# Patient Record
Sex: FEMALE | Race: WHITE | Employment: OTHER | URBAN - NONMETROPOLITAN AREA
[De-identification: names, ages, dates, MRNs, and addresses within clinical notes are randomized per-mention and may not be internally consistent; named-entity substitution may affect disease eponyms.]

---

## 2019-09-21 ENCOUNTER — OFFICE VISIT (OUTPATIENT)
Dept: PRIMARY CARE CLINIC | Age: 64
End: 2019-09-21
Payer: COMMERCIAL

## 2019-09-21 VITALS
SYSTOLIC BLOOD PRESSURE: 136 MMHG | BODY MASS INDEX: 29.87 KG/M2 | OXYGEN SATURATION: 98 % | WEIGHT: 158.2 LBS | RESPIRATION RATE: 18 BRPM | TEMPERATURE: 98.1 F | DIASTOLIC BLOOD PRESSURE: 78 MMHG | HEART RATE: 77 BPM | HEIGHT: 61 IN

## 2019-09-21 DIAGNOSIS — T50.905A ADVERSE EFFECT OF DRUG, INITIAL ENCOUNTER: Primary | ICD-10-CM

## 2019-09-21 PROCEDURE — G8427 DOCREV CUR MEDS BY ELIG CLIN: HCPCS | Performed by: FAMILY MEDICINE

## 2019-09-21 PROCEDURE — 1036F TOBACCO NON-USER: CPT | Performed by: FAMILY MEDICINE

## 2019-09-21 PROCEDURE — G8419 CALC BMI OUT NRM PARAM NOF/U: HCPCS | Performed by: FAMILY MEDICINE

## 2019-09-21 PROCEDURE — 99202 OFFICE O/P NEW SF 15 MIN: CPT | Performed by: FAMILY MEDICINE

## 2019-09-21 PROCEDURE — 3017F COLORECTAL CA SCREEN DOC REV: CPT | Performed by: FAMILY MEDICINE

## 2019-09-21 RX ORDER — ACETAMINOPHEN, ASPIRIN AND CAFFEINE 250; 250; 65 MG/1; MG/1; MG/1
1 TABLET, FILM COATED ORAL
COMMUNITY

## 2019-09-21 RX ORDER — VENLAFAXINE HYDROCHLORIDE 150 MG/1
CAPSULE, EXTENDED RELEASE ORAL
COMMUNITY
Start: 2019-07-22

## 2019-09-21 RX ORDER — ACETAMINOPHEN 500 MG
500 TABLET ORAL
COMMUNITY

## 2019-09-21 RX ORDER — PREDNISONE 20 MG/1
TABLET ORAL
Qty: 10 TABLET | Refills: 0 | Status: SHIPPED | OUTPATIENT
Start: 2019-09-21 | End: 2022-07-28

## 2019-09-21 RX ORDER — CHLORAL HYDRATE 500 MG
2 CAPSULE ORAL
COMMUNITY

## 2019-09-21 RX ORDER — LEVOTHYROXINE SODIUM 88 UG/1
TABLET ORAL
COMMUNITY
Start: 2018-10-25

## 2019-09-21 RX ORDER — LISINOPRIL 40 MG/1
TABLET ORAL
COMMUNITY
Start: 2019-07-30

## 2019-09-21 ASSESSMENT — ENCOUNTER SYMPTOMS
COLOR CHANGE: 1
EYES NEGATIVE: 1
CHEST TIGHTNESS: 0
SHORTNESS OF BREATH: 0
GASTROINTESTINAL NEGATIVE: 1
SORE THROAT: 0
TROUBLE SWALLOWING: 0
FACIAL SWELLING: 1
WHEEZING: 0

## 2019-09-21 NOTE — PROGRESS NOTES
Think that [facility] would be an appropriate next choice. Orders Placed This Encounter   Medications    predniSONE (DELTASONE) 20 MG tablet     Si bid for 3 days, 1 qd for 4 days     Dispense:  10 tablet     Refill:  0     Will give prednisone to help with acute reaction. Can use benadryl or claritin or zyrtec. Return  if no improvement in symptoms or if any further symptoms arise. \    No follow-ups on file. An electronic signature was used to authenticate this note.     --Julio César Cuba DO on 2019 at 3:24 PM

## 2022-07-28 ENCOUNTER — OFFICE VISIT (OUTPATIENT)
Dept: PRIMARY CARE CLINIC | Age: 67
End: 2022-07-28
Payer: MEDICARE

## 2022-07-28 VITALS
WEIGHT: 156 LBS | DIASTOLIC BLOOD PRESSURE: 88 MMHG | BODY MASS INDEX: 29.48 KG/M2 | HEART RATE: 74 BPM | SYSTOLIC BLOOD PRESSURE: 120 MMHG | TEMPERATURE: 98.2 F

## 2022-07-28 DIAGNOSIS — L23.7 POISON IVY DERMATITIS: Primary | ICD-10-CM

## 2022-07-28 PROCEDURE — 1090F PRES/ABSN URINE INCON ASSESS: CPT | Performed by: FAMILY MEDICINE

## 2022-07-28 PROCEDURE — 3017F COLORECTAL CA SCREEN DOC REV: CPT | Performed by: FAMILY MEDICINE

## 2022-07-28 PROCEDURE — 99212 OFFICE O/P EST SF 10 MIN: CPT | Performed by: FAMILY MEDICINE

## 2022-07-28 PROCEDURE — G8400 PT W/DXA NO RESULTS DOC: HCPCS | Performed by: FAMILY MEDICINE

## 2022-07-28 PROCEDURE — G8419 CALC BMI OUT NRM PARAM NOF/U: HCPCS | Performed by: FAMILY MEDICINE

## 2022-07-28 PROCEDURE — 1036F TOBACCO NON-USER: CPT | Performed by: FAMILY MEDICINE

## 2022-07-28 PROCEDURE — 1123F ACP DISCUSS/DSCN MKR DOCD: CPT | Performed by: FAMILY MEDICINE

## 2022-07-28 PROCEDURE — 99213 OFFICE O/P EST LOW 20 MIN: CPT | Performed by: FAMILY MEDICINE

## 2022-07-28 PROCEDURE — G8427 DOCREV CUR MEDS BY ELIG CLIN: HCPCS | Performed by: FAMILY MEDICINE

## 2022-07-28 RX ORDER — PREDNISONE 20 MG/1
TABLET ORAL
Qty: 15 TABLET | Refills: 0 | Status: SHIPPED | OUTPATIENT
Start: 2022-07-28 | End: 2022-07-28 | Stop reason: SDUPTHER

## 2022-07-28 RX ORDER — PREDNISONE 20 MG/1
TABLET ORAL
Qty: 15 TABLET | Refills: 0 | Status: SHIPPED | OUTPATIENT
Start: 2022-07-28

## 2022-07-28 ASSESSMENT — PATIENT HEALTH QUESTIONNAIRE - PHQ9
SUM OF ALL RESPONSES TO PHQ QUESTIONS 1-9: 0
SUM OF ALL RESPONSES TO PHQ QUESTIONS 1-9: 0
2. FEELING DOWN, DEPRESSED OR HOPELESS: 0
SUM OF ALL RESPONSES TO PHQ QUESTIONS 1-9: 0
SUM OF ALL RESPONSES TO PHQ9 QUESTIONS 1 & 2: 0
1. LITTLE INTEREST OR PLEASURE IN DOING THINGS: 0
SUM OF ALL RESPONSES TO PHQ QUESTIONS 1-9: 0

## 2022-07-28 ASSESSMENT — ENCOUNTER SYMPTOMS
WHEEZING: 0
SORE THROAT: 0
ABDOMINAL PAIN: 0
COUGH: 0
SHORTNESS OF BREATH: 0
COLOR CHANGE: 1
DIARRHEA: 0
NAUSEA: 0
RHINORRHEA: 0

## 2022-07-28 NOTE — PROGRESS NOTES
[facility] URGENT CARE A DEPARTMENT OF [place] [address]  [address] [place] [address]  Dept: [phone]  Dept Fax: [phone]    Davian Lopez is a 74 y.o. female who presents today for her medical conditions/complaints as noted below. Davian Lopez is c/o of   Chief Complaint   Patient presents with    Rash       HPI:     Here today for a rash. Rash  This is a new problem. The current episode started in the past 7 days (4 days). The affected locations include the face and neck. The rash is characterized by itchiness, draining and redness. She was exposed to plant contact. Pertinent negatives include no congestion, cough, diarrhea, fatigue, fever, rhinorrhea, shortness of breath or sore throat. Past treatments include nothing. The treatment provided no relief. History reviewed. No pertinent past medical history. Social History     Tobacco Use    Smoking status: Never    Smokeless tobacco: Never   Substance Use Topics    Alcohol use: Not on file     Current Outpatient Medications   Medication Sig Dispense Refill    venlafaxine (EFFEXOR XR) 150 MG extended release capsule       lisinopril (PRINIVIL;ZESTRIL) 40 MG tablet       levothyroxine (SYNTHROID) 88 MCG tablet TAKE 1 TABLET DAILY      aspirin-acetaminophen-caffeine (EXCEDRIN MIGRAINE) 250-250-65 MG per tablet Take 1 tablet by mouth      Multiple Vitamins-Minerals (MULTIVITAMIN ADULT EXTRA C PO) Take 1 tablet by mouth      Omega-3 Fatty Acids (FISH OIL) 1000 MG CAPS Take 2 g by mouth      acetaminophen (TYLENOL) 500 MG tablet Take 500 mg by mouth      predniSONE (DELTASONE) 20 MG tablet 1 bid for 5 days, 1 qd for 5 days 15 tablet 0     No current facility-administered medications for this visit.           Allergies   Allergen Reactions    Statins Rash    Lovastatin      MYALGIAS    Fenofibrate Rash     Generalized erythema and skin peeling, swelling of lips. Subjective:     Review of Systems   Constitutional:  Negative for activity change, appetite change, fatigue and fever. HENT:  Negative for congestion, rhinorrhea and sore throat. Respiratory:  Negative for cough, shortness of breath and wheezing. Gastrointestinal:  Negative for abdominal pain, diarrhea and nausea. Skin:  Positive for color change and rash. Objective:      Physical Exam  Vitals and nursing note reviewed. Constitutional:       General: She is not in acute distress. Appearance: She is well-developed. Eyes:      Conjunctiva/sclera: Conjunctivae normal.   Neck:      Thyroid: No thyromegaly. Cardiovascular:      Rate and Rhythm: Normal rate and regular rhythm. Heart sounds: Normal heart sounds. No murmur heard. Pulmonary:      Effort: Pulmonary effort is normal. No respiratory distress. Breath sounds: Normal breath sounds. No wheezing. Musculoskeletal:      Cervical back: Normal range of motion and neck supple. Lymphadenopathy:      Cervical: No cervical adenopathy. Skin:     General: Skin is warm and dry. Findings: Rash present. No erythema. Rash is vesicular. Neurological:      Mental Status: She is alert and oriented to person, place, and time. /88 (Site: Left Upper Arm, Position: Sitting, Cuff Size: Large Adult)   Pulse 74   Temp 98.2 °F (36.8 °C) (Tympanic)   Wt 156 lb (70.8 kg)   BMI 29.48 kg/m²     Assessment:       Diagnosis Orders   1. Poison ivy dermatitis                  Plan:       Poison ivy: new; she was given a steroid taper. she was advised to wash her hands and clothes if she comes into contact with poison ivy in the future. Return if symptoms worsen or fail to improve.       Orders Placed This Encounter   Medications    DISCONTD: predniSONE (DELTASONE) 20 MG tablet     Si bid for 5 days, 1 qd for 5 days     Dispense:  15 tablet     Refill:  0    predniSONE (DELTASONE) 20 MG tablet Si bid for 5 days, 1 qd for 5 days     Dispense:  15 tablet     Refill:  0         Patientgiven educational materials - see patient instructions. Discussed use, benefit,and side effects of prescribed medications. All patient questions answered. Ptvoiced understanding. Reviewed health maintenance. Instructed to continue currentmedications, diet and exercise. Patient agreed with treatment plan. Follow up asdirected.      Electronically signed by Barbara Groves MD on 2022 at 12:51 PM

## 2022-12-09 ENCOUNTER — OFFICE VISIT (OUTPATIENT)
Dept: PRIMARY CARE CLINIC | Age: 67
End: 2022-12-09
Payer: MEDICARE

## 2022-12-09 ENCOUNTER — HOSPITAL ENCOUNTER (OUTPATIENT)
Age: 67
Setting detail: SPECIMEN
Discharge: HOME | End: 2022-12-09
Payer: MEDICARE

## 2022-12-09 VITALS
TEMPERATURE: 96.8 F | DIASTOLIC BLOOD PRESSURE: 80 MMHG | HEIGHT: 61 IN | HEART RATE: 97 BPM | WEIGHT: 157 LBS | SYSTOLIC BLOOD PRESSURE: 108 MMHG | BODY MASS INDEX: 29.64 KG/M2 | OXYGEN SATURATION: 96 %

## 2022-12-09 DIAGNOSIS — R30.0 DYSURIA: ICD-10-CM

## 2022-12-09 DIAGNOSIS — N30.01 ACUTE CYSTITIS WITH HEMATURIA: Primary | ICD-10-CM

## 2022-12-09 LAB
BACTERIA: ABNORMAL
BILIRUBIN URINE: NEGATIVE
COMMENT UA: ABNORMAL
EPITHELIAL CELLS UA: ABNORMAL /HPF (ref 0–5)
GLUCOSE URINE: ABNORMAL
KETONES, URINE: ABNORMAL
LEUKOCYTE ESTERASE, URINE: ABNORMAL
NITRITE, URINE: POSITIVE
OTHER OBSERVATIONS UA: ABNORMAL
PH UA: 7 (ref 5–6)
PROTEIN UA: ABNORMAL
RBC UA: ABNORMAL /HPF (ref 0–4)
SPECIFIC GRAVITY UA: 1.01 (ref 1.01–1.02)
URINE HGB: ABNORMAL
UROBILINOGEN, URINE: ABNORMAL
WBC UA: >50 /HPF (ref 0–4)

## 2022-12-09 PROCEDURE — 87186 SC STD MICRODIL/AGAR DIL: CPT

## 2022-12-09 PROCEDURE — 87086 URINE CULTURE/COLONY COUNT: CPT

## 2022-12-09 PROCEDURE — G8427 DOCREV CUR MEDS BY ELIG CLIN: HCPCS | Performed by: STUDENT IN AN ORGANIZED HEALTH CARE EDUCATION/TRAINING PROGRAM

## 2022-12-09 PROCEDURE — G8417 CALC BMI ABV UP PARAM F/U: HCPCS | Performed by: STUDENT IN AN ORGANIZED HEALTH CARE EDUCATION/TRAINING PROGRAM

## 2022-12-09 PROCEDURE — 1036F TOBACCO NON-USER: CPT | Performed by: STUDENT IN AN ORGANIZED HEALTH CARE EDUCATION/TRAINING PROGRAM

## 2022-12-09 PROCEDURE — 1090F PRES/ABSN URINE INCON ASSESS: CPT | Performed by: STUDENT IN AN ORGANIZED HEALTH CARE EDUCATION/TRAINING PROGRAM

## 2022-12-09 PROCEDURE — 81001 URINALYSIS AUTO W/SCOPE: CPT

## 2022-12-09 PROCEDURE — 99213 OFFICE O/P EST LOW 20 MIN: CPT | Performed by: STUDENT IN AN ORGANIZED HEALTH CARE EDUCATION/TRAINING PROGRAM

## 2022-12-09 PROCEDURE — G8484 FLU IMMUNIZE NO ADMIN: HCPCS | Performed by: STUDENT IN AN ORGANIZED HEALTH CARE EDUCATION/TRAINING PROGRAM

## 2022-12-09 PROCEDURE — G8400 PT W/DXA NO RESULTS DOC: HCPCS | Performed by: STUDENT IN AN ORGANIZED HEALTH CARE EDUCATION/TRAINING PROGRAM

## 2022-12-09 PROCEDURE — 87088 URINE BACTERIA CULTURE: CPT

## 2022-12-09 PROCEDURE — 3017F COLORECTAL CA SCREEN DOC REV: CPT | Performed by: STUDENT IN AN ORGANIZED HEALTH CARE EDUCATION/TRAINING PROGRAM

## 2022-12-09 PROCEDURE — 1123F ACP DISCUSS/DSCN MKR DOCD: CPT | Performed by: STUDENT IN AN ORGANIZED HEALTH CARE EDUCATION/TRAINING PROGRAM

## 2022-12-09 RX ORDER — SULFAMETHOXAZOLE AND TRIMETHOPRIM 800; 160 MG/1; MG/1
1 TABLET ORAL 2 TIMES DAILY
Qty: 10 TABLET | Refills: 0 | Status: SHIPPED | OUTPATIENT
Start: 2022-12-09 | End: 2022-12-14

## 2022-12-09 ASSESSMENT — ENCOUNTER SYMPTOMS
COUGH: 0
TROUBLE SWALLOWING: 0
SHORTNESS OF BREATH: 0
DIARRHEA: 0
ABDOMINAL PAIN: 0
BLOOD IN STOOL: 0
NAUSEA: 0
EYE PAIN: 0
VOMITING: 0
CONSTIPATION: 0

## 2022-12-09 ASSESSMENT — PATIENT HEALTH QUESTIONNAIRE - PHQ9
SUM OF ALL RESPONSES TO PHQ QUESTIONS 1-9: 0
SUM OF ALL RESPONSES TO PHQ QUESTIONS 1-9: 0
SUM OF ALL RESPONSES TO PHQ9 QUESTIONS 1 & 2: 0
1. LITTLE INTEREST OR PLEASURE IN DOING THINGS: 0
SUM OF ALL RESPONSES TO PHQ QUESTIONS 1-9: 0
SUM OF ALL RESPONSES TO PHQ QUESTIONS 1-9: 0
2. FEELING DOWN, DEPRESSED OR HOPELESS: 0

## 2022-12-09 NOTE — PROGRESS NOTES
[facility] ([place], [place]) Urgent Care             [address] [facility], [place], [address]                        Telephone [phone]             Fax [phone]       Bertha Dumont  :  1955  Age:  74 y.o. MRN:  [id number]  Date of visit:  2022     Assessment and Plan:    1. Acute cystitis with hematuria  UA positive for acute cystitis with hematuria we will treat with Bactrim double strength twice daily for 5 days. Recommend returning to the urgent care for any new or worsening symptoms. Follow-up as needed for this issue. Culture taken today will follow up on the culture results when they are available and 2 to 3 days. - sulfamethoxazole-trimethoprim (BACTRIM DS) 800-160 MG per tablet; Take 1 tablet by mouth 2 times daily for 5 days  Dispense: 10 tablet; Refill: 0    2. Dysuria  - Urinalysis with Reflex to Culture; Future      Subjective:    Bertha Dumont is a 74 y.o. female who presents to [facility] ([place], [place]) Urgent Care today (2022) for evaluation of:  Urinary Frequency (Urgency,painful and burning. Sx began Tuesday.  )    Patient is a 82-year-old female who presents to the urgent care for evaluation of urgency, painful urination for the last 3 to 4 days. States that she is also been having some mild pelvic tenderness and low back pain over the same time. She states that she has not had any fevers, chills, night sweats. Denies any other systemic symptoms at this time. Chief Complaint   Patient presents with    Urinary Frequency     Urgency,painful and burning. Sx began Tuesday. She has the following problem list:  There is no problem list on file for this patient. Review of Systems   Constitutional:  Negative for chills, fatigue and fever. HENT:  Negative for ear pain, postnasal drip and trouble swallowing. Eyes:  Negative for pain and visual disturbance.    Respiratory:  Negative for cough and shortness of breath. Cardiovascular:  Negative for chest pain and palpitations. Gastrointestinal:  Negative for abdominal pain, blood in stool, constipation, diarrhea, nausea and vomiting. Genitourinary:  Positive for dysuria, frequency and urgency. Skin:  Negative for rash and wound. Neurological:  Negative for dizziness and headaches. Psychiatric/Behavioral:  Negative for dysphoric mood. The patient is not nervous/anxious. Current medications are:  Current Outpatient Medications   Medication Sig Dispense Refill    ELDERBERRY PO Take by mouth      Zinc Acetate, Oral, (ZINC ACETATE PO) Take by mouth      sulfamethoxazole-trimethoprim (BACTRIM DS) 800-160 MG per tablet Take 1 tablet by mouth 2 times daily for 5 days 10 tablet 0    venlafaxine (EFFEXOR XR) 150 MG extended release capsule       lisinopril (PRINIVIL;ZESTRIL) 40 MG tablet       levothyroxine (SYNTHROID) 88 MCG tablet TAKE 1 TABLET DAILY      aspirin-acetaminophen-caffeine (EXCEDRIN MIGRAINE) 250-250-65 MG per tablet Take 1 tablet by mouth      Multiple Vitamins-Minerals (MULTIVITAMIN ADULT EXTRA C PO) Take 1 tablet by mouth      Omega-3 Fatty Acids (FISH OIL) 1000 MG CAPS Take 2 g by mouth      acetaminophen (TYLENOL) 500 MG tablet Take 500 mg by mouth      predniSONE (DELTASONE) 20 MG tablet 1 bid for 5 days, 1 qd for 5 days (Patient not taking: Reported on 12/9/2022) 15 tablet 0     No current facility-administered medications for this visit. She is allergic to statins, lovastatin, and fenofibrate. She  reports that she has never smoked. She has never used smokeless tobacco.      Objective:    Vitals:    12/09/22 1224   BP: 108/80   Pulse: 97   Temp: 96.8 °F (36 °C)   TempSrc: Tympanic   SpO2: 96%   Weight: 157 lb (71.2 kg)   Height: 5' 1\" (1.549 m)     Body mass index is 29.66 kg/m². Physical Exam  Vitals and nursing note reviewed. Constitutional:       General: She is not in acute distress. Appearance: She is well-developed. She is not diaphoretic. HENT:      Head: Normocephalic and atraumatic. Right Ear: External ear normal.      Left Ear: External ear normal.      Nose: Nose normal.   Eyes:      General: No scleral icterus. Right eye: No discharge. Left eye: No discharge. Conjunctiva/sclera: Conjunctivae normal.   Cardiovascular:      Rate and Rhythm: Normal rate and regular rhythm. Heart sounds: Normal heart sounds. No murmur heard. Pulmonary:      Effort: Pulmonary effort is normal.      Breath sounds: Normal breath sounds. Musculoskeletal:      Cervical back: Normal range of motion. Skin:     General: Skin is warm and dry. Findings: No erythema or rash. Neurological:      Mental Status: She is alert and oriented to person, place, and time. Cranial Nerves: No cranial nerve deficit. Psychiatric:         Behavior: Behavior normal.         Thought Content:  Thought content normal.         Judgment: Judgment normal.             (Please note that portions of this note were completed with a voice-recognition program. Efforts were made to edit the dictation but occasionally words are mis-transcribed.)

## 2022-12-11 LAB
CULTURE: ABNORMAL
SPECIMEN DESCRIPTION: ABNORMAL

## 2025-06-05 ENCOUNTER — OFFICE VISIT (OUTPATIENT)
Dept: PRIMARY CARE CLINIC | Age: 70
End: 2025-06-05
Payer: MEDICARE

## 2025-06-05 VITALS
OXYGEN SATURATION: 97 % | HEART RATE: 67 BPM | WEIGHT: 151 LBS | BODY MASS INDEX: 28.51 KG/M2 | SYSTOLIC BLOOD PRESSURE: 104 MMHG | TEMPERATURE: 97.6 F | DIASTOLIC BLOOD PRESSURE: 64 MMHG | HEIGHT: 61 IN

## 2025-06-05 DIAGNOSIS — L23.7 POISON IVY DERMATITIS: Primary | ICD-10-CM

## 2025-06-05 PROCEDURE — G8400 PT W/DXA NO RESULTS DOC: HCPCS | Performed by: PHYSICIAN ASSISTANT

## 2025-06-05 PROCEDURE — 1036F TOBACCO NON-USER: CPT | Performed by: PHYSICIAN ASSISTANT

## 2025-06-05 PROCEDURE — 3017F COLORECTAL CA SCREEN DOC REV: CPT | Performed by: PHYSICIAN ASSISTANT

## 2025-06-05 PROCEDURE — 1123F ACP DISCUSS/DSCN MKR DOCD: CPT | Performed by: PHYSICIAN ASSISTANT

## 2025-06-05 PROCEDURE — G8419 CALC BMI OUT NRM PARAM NOF/U: HCPCS | Performed by: PHYSICIAN ASSISTANT

## 2025-06-05 PROCEDURE — 1090F PRES/ABSN URINE INCON ASSESS: CPT | Performed by: PHYSICIAN ASSISTANT

## 2025-06-05 PROCEDURE — 99213 OFFICE O/P EST LOW 20 MIN: CPT | Performed by: PHYSICIAN ASSISTANT

## 2025-06-05 PROCEDURE — PBSHW PBB SHADOW CHARGE: Performed by: PHYSICIAN ASSISTANT

## 2025-06-05 PROCEDURE — G8427 DOCREV CUR MEDS BY ELIG CLIN: HCPCS | Performed by: PHYSICIAN ASSISTANT

## 2025-06-05 PROCEDURE — 1160F RVW MEDS BY RX/DR IN RCRD: CPT | Performed by: PHYSICIAN ASSISTANT

## 2025-06-05 PROCEDURE — 1159F MED LIST DOCD IN RCRD: CPT | Performed by: PHYSICIAN ASSISTANT

## 2025-06-05 PROCEDURE — 99214 OFFICE O/P EST MOD 30 MIN: CPT | Performed by: PHYSICIAN ASSISTANT

## 2025-06-05 PROCEDURE — 1126F AMNT PAIN NOTED NONE PRSNT: CPT | Performed by: PHYSICIAN ASSISTANT

## 2025-06-05 RX ORDER — TRIAMCINOLONE ACETONIDE 40 MG/ML
40 INJECTION, SUSPENSION INTRA-ARTICULAR; INTRAMUSCULAR ONCE
Status: COMPLETED | OUTPATIENT
Start: 2025-06-05 | End: 2025-06-05

## 2025-06-05 RX ORDER — LOVASTATIN 40 MG/1
40 TABLET ORAL NIGHTLY
COMMUNITY
Start: 2025-01-29

## 2025-06-05 RX ORDER — IBUPROFEN 200 MG
200 TABLET ORAL 2 TIMES DAILY
COMMUNITY

## 2025-06-05 RX ORDER — LEVOTHYROXINE SODIUM 75 UG/1
75 TABLET ORAL DAILY
COMMUNITY
Start: 2025-05-15

## 2025-06-05 RX ORDER — PREDNISONE 10 MG/1
10 TABLET ORAL 2 TIMES DAILY
Qty: 10 TABLET | Refills: 0 | Status: SHIPPED | OUTPATIENT
Start: 2025-06-05 | End: 2025-06-10

## 2025-06-05 RX ORDER — ASPIRIN 81 MG/1
81 TABLET, CHEWABLE ORAL DAILY
COMMUNITY

## 2025-06-05 RX ADMIN — TRIAMCINOLONE ACETONIDE 40 MG: 40 INJECTION, SUSPENSION INTRA-ARTICULAR; INTRAMUSCULAR at 14:35

## 2025-06-05 ASSESSMENT — ENCOUNTER SYMPTOMS
SHORTNESS OF BREATH: 0
DIARRHEA: 0
NAUSEA: 0
VOMITING: 0

## 2025-06-05 NOTE — PROGRESS NOTES
[facility]  [address]  [facility] [address]  Dept: [phone]  Dept Fax: [phone]    Elaine Block is a 69 y.o. female who presents today for her medical conditions/complaints as noted below. Elaine Block is c/o of   Chief Complaint   Patient presents with    Rash     Around R eye, started last night after pulling weeds--  Itching   .    HPI:     Patient is a 68 yo female presenting today due to a rash around her right eye. It began last night after she was pulling weeds. She has had poison ivy in the past.     Rash  This is a new problem. The current episode started yesterday. The problem has been gradually worsening since onset. The affected locations include the right eye. The rash is characterized by redness and itchiness. She was exposed to plant contact. Pertinent negatives include no diarrhea, fever, shortness of breath or vomiting. Past treatments include cold compress. The treatment provided mild relief.         History reviewed. No pertinent past medical history.  History reviewed. No pertinent surgical history.    History reviewed. No pertinent family history.    Social History     Tobacco Use    Smoking status: Never     Passive exposure: Never    Smokeless tobacco: Never   Substance Use Topics    Alcohol use: Not Currently      Prior to Visit Medications    Medication Sig Taking? Authorizing Provider   ibuprofen (ADVIL;MOTRIN) 200 MG tablet Take 1 tablet by mouth in the morning and at bedtime Yes Poornima Aguirre MD   levothyroxine (SYNTHROID) 75 MCG tablet Take 1 tablet by mouth Daily Yes Poornima Aguirre MD   aspirin 81 MG chewable tablet Take 1 tablet by mouth daily Yes Poornima Aguirre MD   Calcium Carb-Cholecalciferol (OYSTER SHELL CALCIUM W/D) 500-5 MG-MCG TABS tablet Take 1 tablet by mouth daily Yes Poornima Aguirre MD

## 2025-06-05 NOTE — PATIENT INSTRUCTIONS
Steroid x 5 days  Discussed taking medication as prescribed in AM with food  Avoid NSAIDs while taking prescription steroid  Take allergy medication as prescribed for 30 days and follow up with PCP  If symptoms worsen, SOB, difficulty swallowing seek medical treatment  Patient verbalized understanding and agrees with plan of care

## 2025-08-01 ENCOUNTER — OFFICE VISIT (OUTPATIENT)
Dept: PRIMARY CARE CLINIC | Age: 70
End: 2025-08-01
Payer: MEDICARE

## 2025-08-01 VITALS
DIASTOLIC BLOOD PRESSURE: 66 MMHG | SYSTOLIC BLOOD PRESSURE: 108 MMHG | OXYGEN SATURATION: 97 % | HEIGHT: 61 IN | HEART RATE: 60 BPM | WEIGHT: 151.4 LBS | BODY MASS INDEX: 28.58 KG/M2

## 2025-08-01 DIAGNOSIS — T78.40XA ALLERGIC REACTION, INITIAL ENCOUNTER: Primary | ICD-10-CM

## 2025-08-01 PROCEDURE — 1036F TOBACCO NON-USER: CPT | Performed by: PHYSICIAN ASSISTANT

## 2025-08-01 PROCEDURE — 1123F ACP DISCUSS/DSCN MKR DOCD: CPT | Performed by: PHYSICIAN ASSISTANT

## 2025-08-01 PROCEDURE — 1160F RVW MEDS BY RX/DR IN RCRD: CPT | Performed by: PHYSICIAN ASSISTANT

## 2025-08-01 PROCEDURE — 3017F COLORECTAL CA SCREEN DOC REV: CPT | Performed by: PHYSICIAN ASSISTANT

## 2025-08-01 PROCEDURE — 99213 OFFICE O/P EST LOW 20 MIN: CPT | Performed by: PHYSICIAN ASSISTANT

## 2025-08-01 PROCEDURE — 99212 OFFICE O/P EST SF 10 MIN: CPT | Performed by: PHYSICIAN ASSISTANT

## 2025-08-01 PROCEDURE — 1090F PRES/ABSN URINE INCON ASSESS: CPT | Performed by: PHYSICIAN ASSISTANT

## 2025-08-01 PROCEDURE — 1159F MED LIST DOCD IN RCRD: CPT | Performed by: PHYSICIAN ASSISTANT

## 2025-08-01 PROCEDURE — G8419 CALC BMI OUT NRM PARAM NOF/U: HCPCS | Performed by: PHYSICIAN ASSISTANT

## 2025-08-01 PROCEDURE — PBSHW PBB SHADOW CHARGE: Performed by: PHYSICIAN ASSISTANT

## 2025-08-01 PROCEDURE — G8427 DOCREV CUR MEDS BY ELIG CLIN: HCPCS | Performed by: PHYSICIAN ASSISTANT

## 2025-08-01 PROCEDURE — G8400 PT W/DXA NO RESULTS DOC: HCPCS | Performed by: PHYSICIAN ASSISTANT

## 2025-08-01 RX ORDER — TRIAMCINOLONE ACETONIDE 40 MG/ML
40 INJECTION, SUSPENSION INTRA-ARTICULAR; INTRAMUSCULAR ONCE
Status: COMPLETED | OUTPATIENT
Start: 2025-08-01 | End: 2025-08-01

## 2025-08-01 RX ADMIN — TRIAMCINOLONE ACETONIDE 40 MG: 200 INJECTION, SUSPENSION INTRA-ARTICULAR; INTRAMUSCULAR at 10:12

## 2025-08-01 ASSESSMENT — ENCOUNTER SYMPTOMS
VOMITING: 0
NAUSEA: 0
EYE DISCHARGE: 0
WHEEZING: 0
EYE PAIN: 0
SHORTNESS OF BREATH: 0

## 2025-08-01 ASSESSMENT — PATIENT HEALTH QUESTIONNAIRE - PHQ9
SUM OF ALL RESPONSES TO PHQ QUESTIONS 1-9: 0
SUM OF ALL RESPONSES TO PHQ QUESTIONS 1-9: 0
2. FEELING DOWN, DEPRESSED OR HOPELESS: NOT AT ALL
SUM OF ALL RESPONSES TO PHQ QUESTIONS 1-9: 0
SUM OF ALL RESPONSES TO PHQ QUESTIONS 1-9: 0
1. LITTLE INTEREST OR PLEASURE IN DOING THINGS: NOT AT ALL

## 2025-08-01 NOTE — PATIENT INSTRUCTIONS
Avoid peppermint oil rub  Take allergy medication  If symptoms worsen, SOB, difficulty swallowing seek medical treatment  Patient verbalized understanding and agrees with plan of care

## 2025-08-01 NOTE — PROGRESS NOTES
[facility] Walk In department of [facility]  [address] [facility] [address]  Phone: [phone]  Fax: [phone]      Elaine Block  1955  MRN: [id number]  Date of visit: 8/1/2025    Chief Complaint:     Elaine Block is here for c/o of Poison Ivy (Started Wednesday and is getting worse)      HPI:     Elaine Block is a 70 y.o. female who presents to the [facility] Walk-In Care today for her medical conditions/complaints as noted below.    History of Present Illness  The patient is a 70-year-old female presenting today due to a poison ivy rash. She reports that the rash started on Wednesday and is getting worse.    She reports swelling around her right eye with itching. The condition appears to be spreading. She suspects exposure to poison ivy due to her habit of pulling weeds. She also mentions using a peppermint roller for chronic headaches, which she applied too close to her eye, causing a stinging sensation. She rubbed her eye afterwards. She reports no fever or chills, and no pain in the affected eye. Her vision remains unaffected. The symptoms have worsened compared to the previous day. She attempted to alleviate the discomfort by applying ice packs during the night. She reports no shortness of breath, wheezing, chest pain, nausea, vomiting, dizziness, or lightheadedness.    History reviewed. No pertinent past medical history.     Allergies   Allergen Reactions    Statins Rash    Lovastatin      MYALGIAS    Fenofibrate Rash     Generalized erythema and skin peeling, swelling of lips.         Subjective:      Review of Systems   Constitutional:  Negative for chills and fever.   Eyes:  Negative for pain, discharge and visual disturbance.        Right eye swelling     Respiratory:  Negative for shortness of breath and wheezing.    Cardiovascular:  Negative for chest pain.   Gastrointestinal:  Negative for nausea and vomiting.   Skin:  Positive for